# Patient Record
Sex: MALE | Race: AMERICAN INDIAN OR ALASKA NATIVE
[De-identification: names, ages, dates, MRNs, and addresses within clinical notes are randomized per-mention and may not be internally consistent; named-entity substitution may affect disease eponyms.]

---

## 2018-02-02 ENCOUNTER — HOSPITAL ENCOUNTER (EMERGENCY)
Dept: HOSPITAL 31 - C.ER | Age: 43
Discharge: HOME | End: 2018-02-02
Payer: MEDICAID

## 2018-02-02 VITALS — TEMPERATURE: 97.8 F | OXYGEN SATURATION: 97 % | HEART RATE: 93 BPM | RESPIRATION RATE: 20 BRPM

## 2018-02-02 VITALS — SYSTOLIC BLOOD PRESSURE: 160 MMHG | DIASTOLIC BLOOD PRESSURE: 89 MMHG

## 2018-02-02 DIAGNOSIS — M25.571: ICD-10-CM

## 2018-02-02 DIAGNOSIS — E11.9: ICD-10-CM

## 2018-02-02 DIAGNOSIS — M25.572: Primary | ICD-10-CM

## 2018-02-02 NOTE — C.PDOC
Chief Complaint (Nursing): Lower Extremity Problem/Injury





Past Medical History


Vital Signs: 





 Last Vital Signs











Temp  97.8 F   02/02/18 07:01


 


Pulse  93 H  02/02/18 07:01


 


Resp  20   02/02/18 07:01


 


BP  175/111 H  02/02/18 07:01


 


Pulse Ox  97   02/02/18 07:01














- Medical History


PMH: HTN





- Social History


Hx Alcohol Use: Yes


Hx Substance Use: No





- Immunization History


Hx Tetanus Toxoid Vaccination: No


Hx Influenza Vaccination: No


Hx Pneumococcal Vaccination: No





ED Course And Treatment


O2 Sat by Pulse Oximetry: 97





Disposition





- Disposition

## 2018-02-02 NOTE — RAD
PROCEDURE:  Bilateral Ankle Radiographs.



HISTORY:

atraumatic pain  



COMPARISON:

None



FINDINGS:



BONES:

Right Ankle: There is focal periosteal thickening seen at the right 

medial malleolus.  No evidence of acute fracture or dislocation.  

Calcaneal spur is noted. 



Left Ankle: Mild periosteal thickening is also noted at the medial 

malleolus of the left ankle.Calcaneal spur is also noted 



JOINTS:

Right Ankle:  Normal. No osteoarthritis. Ankle mortise maintained. 

Talar dome intact.



Left Ankle: Normal. No osteoarthritis. Ankle mortise maintained. 

Talar dome intact.



SOFT TISSUES:

Right Ankle: Mild soft tissue swelling 



Left Ankle: Mild soft tissue swelling 



OTHER FINDINGS:

None.



IMPRESSION:

No evidence of acute fracture or dislocation.



Mild periosteal thickening noted in both medial malleolus. Bilateral 

calcaneal spur.

## 2018-02-02 NOTE — C.PDOC
History Of Present Illness


42 year old male, with PMHx of HTN and diabetes, presents to ED for evaluation 

of bilateral ankle pain for the past week. Pt denies any trauma, injury, 

extremity weakness/numbness, or fever. Note, pt denies taking his HTN 

medication. 





Chief Complaint (Nursing): Lower Extremity Problem/Injury


History Per: Patient


History/Exam Limitations: no limitations


Onset/Duration Of Symptoms: Days


Current Symptoms Are (Timing): Still Present


Recent travel outside of the [place]: No


Additional History Per: Patient





Past Medical History


Reviewed: Historical Data, Nursing Documentation, Vital Signs


Vital Signs: 


 Last Vital Signs











Temp  97.8 F   02/02/18 07:01


 


Pulse  93 H  02/02/18 07:01


 


Resp  20   02/02/18 07:01


 


BP  160/89 H  02/02/18 08:51


 


Pulse Ox  97   02/02/18 11:02














- Medical History


PMH: Diabetes, HTN


Family History: States: No Known Family Hx





- Social History


Hx Alcohol Use: Yes


Hx Substance Use: No





- Immunization History


Hx Tetanus Toxoid Vaccination: No


Hx Influenza Vaccination: No


Hx Pneumococcal Vaccination: No





Review Of Systems


Except As Marked, All Systems Reviewed And Found Negative.


Constitutional: Negative for: Fever, Chills


Musculoskeletal: Positive for: Foot Pain (bilateral ankle).  Negative for: Leg 

Pain


Skin: Negative for: Rash, Bruising


Neurological: Negative for: Weakness, Numbness





Physical Exam





- Physical Exam


Appears: Non-toxic, No Acute Distress


Skin: Normal Color, Warm, Dry


Head: Atraumatic, Normacephalic


Extremity: Normal ROM (FROM of bilateral feet), Tenderness (bilateral ankle), 

No Pedal Edema, No Calf Tenderness, Capillary Refill (less than 2 seconds), No 

Deformity, No Swelling


Extremity: Bilateral: Atraumatic, Normal ROM


Pulses: Left Dorsalis Pedis: Normal, Right Dorsalis Pedis: Normal


Neurological/Psych: Oriented x3, Normal Speech, Normal Motor, Normal Sensation





ED Course And Treatment


O2 Sat by Pulse Oximetry: 97 (RA)


Pulse Ox Interpretation: Normal





- Other Rad


  ** B/L ankle x-ray


X-Ray: Interpreted by Me, Viewed By Me


Interpretation: No acute fracture or dislocation.


Progress Note: Bilateral ankle x-ray ordered and reviewed. Pt was given Motrin 

and Clonidine. Blood sugar was found to be low, pt was given food. On re-eval, 

pt is resting comfortably, no acute distress. Patient is being discharged home, 

with instructions to follow up with PMD in 1-2 days for further evaluation. 

Return to ED if symptoms persist or worsen.





Disposition





- Disposition


Referrals: 


Sarah Heredia MD [Staff Provider] - 


Disposition: HOME/ ROUTINE


Disposition Time: 10:33


Condition: STABLE


Additional Instructions: 


FOLLOW UP WITH PMD WITHIN 1-2 DAYS. RETURN TO ED IF FEEL WORSE. TAKE ALL YOUR 

MEDICATIONS AS PRESCRIBED BY YOUR PMD.


Prescriptions: 


Ibuprofen [Motrin Tab] 600 mg PO Q8 #30 tab


Famotidine [Pepcid] 20 mg PO BID #20 tab


Instructions:  Diabetic Foot Care (ED), Peripheral Neuropathy (ED)


Forms:  [facility] (English)





- Clinical Impression


Clinical Impression: 


 Pain in both feet








- PA / NP / Resident Statement


MD/DO has reviewed & agrees with the documentation as recorded.





- Scribe Statement


The provider has reviewed the documentation as recorded by the Yaniraibshekhar Paul





All medical record entries made by the Yaniraibshekhar were at my direction and 

personally dictated by me. I have reviewed the chart and agree that the record 

accurately reflects my personal performance of the history, physical exam, 

medical decision making, and the department course for this patient. I have 

also personally directed, reviewed, and agree with the discharge instructions 

and disposition.

## 2018-06-06 ENCOUNTER — HOSPITAL ENCOUNTER (EMERGENCY)
Dept: HOSPITAL 31 - C.ER | Age: 43
Discharge: HOME | End: 2018-06-06
Payer: MEDICAID

## 2018-06-06 VITALS
HEART RATE: 85 BPM | SYSTOLIC BLOOD PRESSURE: 152 MMHG | OXYGEN SATURATION: 97 % | TEMPERATURE: 98.5 F | RESPIRATION RATE: 18 BRPM | DIASTOLIC BLOOD PRESSURE: 94 MMHG

## 2018-06-06 VITALS — BODY MASS INDEX: 38.4 KG/M2

## 2018-06-06 DIAGNOSIS — E11.9: ICD-10-CM

## 2018-06-06 DIAGNOSIS — F11.10: Primary | ICD-10-CM

## 2018-06-06 DIAGNOSIS — I10: ICD-10-CM

## 2018-06-06 NOTE — C.PDOC
History Of Present Illness


44 y/o male presents to the ED for substance abuse. Admits that he snorts 20 

bags of heroin daily and drinks alcohol. Patient is complaining of some 

abdominal discomfort. No vomiting. He was able to eat and drink today. Patient 

reports problems with heroin making him see things, but does not want detox at 

this time. States he does not want any evaluation for his abdominal pain. 

Otherwise denies any suicidal or homicidal ideation. 





Time Seen by Provider: 06/06/18 18:12


Chief Complaint (Nursing): Substance Abuse


History Per: Patient


History/Exam Limitations: no limitations


Onset/Duration Of Symptoms: Hrs


Current Symptoms Are (Timing): Still Present


Modifying Factor(s): Alcohol, Other (Heroin)





Past Medical History


Reviewed: Historical Data, Nursing Documentation, Vital Signs


Vital Signs: 


 Last Vital Signs











Temp  98.5 F   06/06/18 17:35


 


Pulse  85   06/06/18 17:35


 


Resp  18   06/06/18 17:35


 


BP  152/94 H  06/06/18 17:35


 


Pulse Ox  97   06/06/18 18:26














- Medical History


PMH: Depression, Diabetes, HTN, Post Traumatic Stress Disorder


Family History: States: Unknown Family Hx





- Social History


Hx Alcohol Use: Yes


Hx Substance Use: Yes





- Immunization History


Hx Tetanus Toxoid Vaccination: No


Hx Influenza Vaccination: No


Hx Pneumococcal Vaccination: No





Review Of Systems


Constitutional: Negative for: Fever


Gastrointestinal: Positive for: Abdominal Pain.  Negative for: Nausea, Vomiting


Psych: Positive for: Other (substance abuse).  Negative for: Suicidal ideation, 

Withdrawal





Physical Exam





- Physical Exam


Appears: Non-toxic, No Acute Distress, Other (Intoxicated, but able to speak)


Skin: Normal Color, Warm, Dry


Head: Atraumatic, Normacephalic


Eye(s): bilateral: Normal Inspection, PERRL, EOMI


Oral Mucosa: Moist


Neck: Normal ROM, Supple


Chest: Symmetrical


Cardiovascular: Rhythm Regular, No Murmur


Respiratory: Normal Breath Sounds, No Rales, No Rhonchi, No Wheezing


Gastrointestinal/Abdominal: Soft, No Tenderness, No Guarding


Extremity: Bilateral: Atraumatic, Normal Color And Temperature, Normal ROM


Neurological/Psych: Normal Speech, Other (Awake, alert, responsive to verbal 

stimuli, ambulatory in the ED)


Gait: Steady





ED Course And Treatment


O2 Sat by Pulse Oximetry: 97 (RA)


Pulse Ox Interpretation: Normal





Medical Decision Making


Medical Decision Making: 


Initial Impression: 44 y/o intoxicated male, refusing detox and eval for abd 

pain





Initial Plan: 


--PO challenge





Patient given food and water in the ED, and is tolerating PO. On examination 

patient remains afebrile, alert, responsive, with clear speech and a steady 

gait. Patient is stable for discharge home. Counseled regarding diagnosis and 

provided with resources for substance abuse. 








Disposition





- Disposition


Disposition: HOME/ ROUTINE


Disposition Time: 18:30


Condition: STABLE


Instructions:  Opioid Use Disorder


Forms:  [employer] (English)





- Clinical Impression


Clinical Impression: 


 Opiate abuse, continuous








- Scribe Statement


The provider has reviewed the documentation as recorded by the Bogdan Mcconnell





Provider Attestation: 





All medical record entries made by the Bogdan were at my direction and 

personally dictated by me. I have reviewed the chart and agree that the record 

accurately reflects my personal performance of the history, physical exam, 

medical decision making, and the department course for this patient. I have 

also personally directed, reviewed, and agree with the discharge instructions 

and disposition.

## 2019-02-05 ENCOUNTER — HOSPITAL ENCOUNTER (INPATIENT)
Dept: HOSPITAL 31 - C.ER | Age: 44
LOS: 4 days | Discharge: HOME | DRG: 881 | End: 2019-02-09
Attending: PSYCHIATRY & NEUROLOGY | Admitting: PSYCHIATRY & NEUROLOGY
Payer: SELF-PAY

## 2019-02-05 VITALS — BODY MASS INDEX: 38.4 KG/M2

## 2019-02-05 DIAGNOSIS — F32.9: Primary | ICD-10-CM

## 2019-02-05 DIAGNOSIS — F60.9: ICD-10-CM

## 2019-02-05 DIAGNOSIS — F16.10: ICD-10-CM

## 2019-02-05 DIAGNOSIS — F43.10: ICD-10-CM

## 2019-02-05 DIAGNOSIS — F10.230: ICD-10-CM

## 2019-02-05 DIAGNOSIS — F14.10: ICD-10-CM

## 2019-02-05 DIAGNOSIS — F11.23: ICD-10-CM

## 2019-02-05 DIAGNOSIS — I10: ICD-10-CM

## 2019-02-05 DIAGNOSIS — E11.9: ICD-10-CM
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
## 2019-02-05 LAB
ALBUMIN SERPL-MCNC: 4 {NULL, G/DL} (ref 3.5–5)
ALBUMIN/GLOB SERPL: 1.6 {NULL, NULL} (ref 1–2.1)
ALT SERPL-CCNC: 16 {NULL, U/L} (ref 21–72)
AST SERPL-CCNC: 24 {NULL, U/L} (ref 17–59)
BASOPHILS # BLD AUTO: 0.1 {NULL, K/UL} (ref 0–0.2)
BASOPHILS NFR BLD: 1 {NULL, %} (ref 0–2)
BILIRUB UR-MCNC: NEGATIVE {NULL, NULL}
BUN SERPL-MCNC: 18 {NULL, MG/DL} (ref 9–20)
CALCIUM SERPL-MCNC: 8.5 {NULL, MG/DL} (ref 8.6–10.4)
EOSINOPHIL # BLD AUTO: 0.3 {NULL, K/UL} (ref 0–0.7)
EOSINOPHIL NFR BLD: 3.1 {NULL, %} (ref 0–4)
ERYTHROCYTE [DISTWIDTH] IN BLOOD BY AUTOMATED COUNT: 13.3 {NULL, %} (ref 11.5–14.5)
GFR NON-AFRICAN AMERICAN: > 60 {NULL, NULL}
GLUCOSE UR STRIP-MCNC: NORMAL {NULL, MG/DL}
HGB BLD-MCNC: 12.8 {NULL, G/DL} (ref 12–18)
LEUKOCYTE ESTERASE UR-ACNC: (no result) {NULL, LEU/UL}
LYMPHOCYTES # BLD AUTO: 3.3 {NULL, K/UL} (ref 1–4.3)
LYMPHOCYTES NFR BLD AUTO: 35.9 {NULL, %} (ref 20–40)
MCH RBC QN AUTO: 28.7 {NULL, PG} (ref 27–31)
MCHC RBC AUTO-ENTMCNC: 33.4 {NULL, G/DL} (ref 33–37)
MCV RBC AUTO: 85.9 {NULL, FL} (ref 80–94)
MONOCYTES # BLD: 0.4 {NULL, K/UL} (ref 0–0.8)
MONOCYTES NFR BLD: 3.9 {NULL, %} (ref 0–10)
NEUTROPHILS # BLD: 5.1 {NULL, K/UL} (ref 1.8–7)
NEUTROPHILS NFR BLD AUTO: 56.1 {NULL, %} (ref 50–75)
NRBC BLD AUTO-RTO: 0 {NULL, %} (ref 0–2)
PH UR STRIP: 5 {NULL, NULL} (ref 5–8)
PLATELET # BLD: 336 {NULL, K/UL} (ref 130–400)
PMV BLD AUTO: 6.8 {NULL, FL} (ref 7.2–11.7)
PROT UR STRIP-MCNC: (no result) {NULL, MG/DL}
RBC # BLD AUTO: 4.46 {NULL, MIL/UL} (ref 4.4–5.9)
RBC # UR STRIP: NEGATIVE {NULL, NULL}
SP GR UR STRIP: 1.02 {NULL, NULL} (ref 1–1.03)
UROBILINOGEN UR-MCNC: 2 {NULL, MG/DL} (ref 0.2–1)
WBC # BLD AUTO: 9.1 {NULL, K/UL} (ref 4.8–10.8)

## 2019-02-05 NOTE — C.PDOC
History Of Present Illness


The patient presents to the ED for psychiatric evaluation. Patient reports 

depression and questionable suicidal ideation. Patient also admits to alcohol, 

heroin, cocaine and PCP use today and is requesting detox. He denies homicidal 

ideation and offers no additional complaints at this time. 


Time Seen by Provider: 02/05/19 23:10


Chief Complaint (Nursing): Substance Abuse


History Per: Patient


History/Exam Limitations: no limitations


Onset/Duration Of Symptoms: Hrs


Current Symptoms Are (Timing): Still Present


Suicide/Self Injury Attempted (Context): None


Modifying Factor(s): Alcohol, Cocaine, Other (heroin, PCP )


Severity: None


Pain Scale Rating Of: 0


Associated Symptoms: Depression, Suicidal Thoughts (questionable )


Involuntary Hold By: None


Recent travel outside of the [place]: No


Additional History Per: Patient





Past Medical History


Reviewed: Historical Data, Nursing Documentation, Vital Signs


Vital Signs: 





                                Last Vital Signs











Temp  98.2 F   02/05/19 22:58


 


Pulse  97 H  02/05/19 22:58


 


Resp  17   02/05/19 22:58


 


BP  191/113 H  02/05/19 22:58


 


Pulse Ox  97   02/05/19 22:58














- Medical History


PMH: Depression, Diabetes, HTN, Post Traumatic Stress Disorder


Surgical History: No Surg Hx


Family History: States: Unknown Family Hx





- Social History


Hx Alcohol Use: Yes


Hx Substance Use: Yes





- Immunization History


Hx Tetanus Toxoid Vaccination: No


Hx Influenza Vaccination: No


Hx Pneumococcal Vaccination: No





Review Of Systems


Constitutional: Negative for: Fever, Chills


Cardiovascular: Negative for: Chest Pain, Palpitations


Respiratory: Negative for: Cough, Shortness of Breath


Gastrointestinal: Negative for: Nausea, Vomiting, Abdominal Pain, Diarrhea


Skin: Negative for: Rash, Lesions, Jaundice, Bruising


Psych: Positive for: Depression, Suicidal ideation (questionable ).  Negative 

for: Other (homicidal ideation )





Physical Exam





- Physical Exam


Appears: Non-toxic, No Acute Distress


Skin: Warm, Dry


Head: Normacephalic


Eye(s): bilateral: Normal Inspection


Oral Mucosa: Moist


Neck: Supple


Chest: Symmetrical, No Deformity


Respiratory: No Accessory Muscle Use


Extremity: Normal ROM


Neurological/Psych: Oriented x3





ED Course And Treatment





- Laboratory Results


Result Diagrams: 


                                 02/05/19 23:36





                                 02/05/19 23:36


O2 Sat by Pulse Oximetry: 97 (on RA)


Pulse Ox Interpretation: Normal


Progress Note: Bloodwork and urinalysis ordered.





Disposition


Discussed With DrCal: Lilia Chaidez


Comment: accepted the pt on her service and took over the care at 4:23 AM


Doctor Will See Patient In The: Hospital


Counseled Patient/Family Regarding: Studies Performed, Diagnosis





- Disposition


Disposition: HOSPITALIZED


Disposition Time: 23:10


Condition: FAIR


Forms:  [facility] (English)





- POA


Present On Arrival: None





- Clinical Impression


Clinical Impression: 


 Drug dependence, Alcohol abuse, Opiate abuse, continuous








- Scribe Statement


The provider has reviewed the documentation as recorded by the Scribe (Melissa Pantoja)


Provider Attestation: 








All medical record entries made by the Scribe were at my direction and 

personally dictated by me. I have reviewed the chart and agree that the record 

accurately reflects my personal performance of the history, physical exam, 

medical decision making, and the department course for this patient. I have also

personally directed, reviewed, and agree with the discharge instructions and 

disposition.





Decision To Admit





- Pt Status Changed To:


Hospital Disposition Of: Inpatient





- Admit Certification


Admit to Inpatient:: After my assessment, the patient will require 

hospitalization for at least two midnights.  This is because of the severity of 

symptoms shown, intensity of services needed, and/or the medical risk in this 

patient being treated as an outpatient.





- InPatient:


Physician Admission Certification: I certify that this patient requires 2 or 

more midnights of care for the following reason:: After my assessment, the 

patient will require hospitalization for at least two midnights.  This is 

because of the severity of symptoms shown, intensity of services needed, and/or 

the medical risk in this patient being treated as an outpatient





- .


Bed Request Type: Detox


Admitting Physician: Lilia Chaidez


Patient Diagnosis: 


 Drug dependence, Alcohol abuse, Opiate abuse, continuous

## 2019-02-06 PROCEDURE — GZHZZZZ GROUP PSYCHOTHERAPY: ICD-10-PCS | Performed by: PSYCHIATRY & NEUROLOGY

## 2019-02-06 PROCEDURE — GZ56ZZZ INDIVIDUAL PSYCHOTHERAPY, SUPPORTIVE: ICD-10-PCS | Performed by: PSYCHIATRY & NEUROLOGY

## 2019-02-06 NOTE — PCM.BM
<Liu Fernandez - Last Filed: 02/06/19 05:16>





Treatment Plan Problems





- Problems identified on initial assessmt


  ** Denial


Date Initiated: 02/06/19


Time Initiated: 06:00


Assessment reference: NA


Status: Active





  ** Defensive Coping


Date Initiated: 02/06/19


Time Initiated: 06:00


Assessment reference: NA


Status: Active





  ** Hopelessness


Date Initiated: 02/06/19


Assessment reference: NA


Status: Active





Treatment assets and liabiliti


Patient Assests: cooperative, ADL independent, negotiates basic needs, 

cognitively intact


Patient Liabilities: substance abuse





- Milieu Protocol


Maintain good personal hygiene: daily Encourage regular showers, daily Remind 

patient to perform daily oral care, daily Assist patient to perform ADL's


Maintain personal safety: every shift Educate patient to report safety concerns 

to staff, every shift Monitor environment for contraband/sharps


Medication safety: Monitor for expected outcome, potential side effects: every 

shift, Assess barriers to learning: every shift, Assess readiness for medication

education: every shift





<Lorna Guerrero - Last Filed: 02/07/19 13:09>





Treatment Plan Problems





- Problems identified on initial assessmt


  ** Denial


Date Initiated: 02/06/19


Time Initiated: 06:00


Assessment reference: NA


Status: Active





  ** Defensive Coping


Date Initiated: 02/06/19


Time Initiated: 06:00


Assessment reference: NA


Status: Active





  ** Hopelessness


Date Initiated: 02/06/19


Assessment reference: NA


Status: Active





Family Contact


Family involvement: No known Family/SO





- Goals for Treatment


Patient goals for treatment: Complete detox and apply for long-term adult rehab 

program.





Discharge/Continuing Care





- Education Needs


Education Needs: Patient Medication, Patient Diagnosis/Disease Process, Patient 

Coping Skills, Patient Anger Management skills, Patient Placement options, 

Patient Community resources





- Discharge


Discharge Criteria: Free of agitation, Normal sleep pattern, No longer exh

ibiting s/s of withdrawal, Reduction of target symptoms


Discharge to:: Substance Abuse Rehab





- Treatment Team Participation


Patient/Family/SO Statement: 





02/07/19 13:10


"I [facility] go to the rehab [facility]..."


Discussed with Family/SO: No


Was Patient/Family/SO present at Treatment Team Meeting: Yes





<Nancy Foreman - Last Filed: 02/09/19 21:07>





- Diagnosis


(1) Opiate abuse, continuous


Status: Acute   


Interventions: 





02/09/19 21:07


* Assess 7x/week regarding severity of withdrawal


* Educate regarding risks, benefits, side effects and alternatives of 

  medications


* Use Motivational Interviewing for abstinence


* Use CBT for relapse prevention


* Medication management for withdrawal symptoms


* Encourage medication assisted treatment


*

## 2019-02-06 NOTE — PCM.PSYCH
Initial Psychiatric Evaluation





- Initial Psychiatric Evaluation


Type of Admission: Voluntary


Legal Status: Capacity


Chief Complaint (in patient's own words): 





"Tired"


History of Present Illness and Precipitating Events: 





The patient is seen, chart reviewed and case discussed.


He is a very poor historian and looks overly sedated and possibly drugged.





This 43-year-old -American male, single, no child and unemployed


He claims he uses up to 100 backs IV and he has been using heroin 6-7 years. He 

was in detox and rehabilitation 3 times each and has history of OD.


He also uses 1 pint of alcohol, half tablets Xanax, cocaine and PCP.


He has history of blackouts and significant withdrawal symptoms.





He reports feeling depressed and yesterday he was suicidal. However today he 

says he is okay.


He also complains of anxiety and irritability.





Past psych history: Denies any treatment





Medical history: Denies





Family psych history: Denies


Current Medications: 





Active Medications











Generic Name Dose Route Start Last Admin





  Trade Name Freq  PRN Reason Stop Dose Admin


 


Clonidine HCl  0.1 mg  02/06/19 05:06  02/06/19 05:41





  Catapres  PO   0.1 mg





  Q6 PRN   Administration





  Symptoms of alcohol withdrawl   





     





     





     


 


Dicyclomine HCl  10 mg  02/06/19 05:05  





  Bentyl  PO   





  Q6 PRN   





  Muscle spasm   





     





     





     


 


Hydroxyzine HCl  25 mg  02/06/19 05:05  





  Atarax  PO   





  Q6 PRN   





  Anxiety   





     





     





     


 


Ibuprofen  600 mg  02/06/19 05:03  





  Motrin Tab  PO   





  Q6 PRN   





  Pain, moderate (4-7)   





     





     





     


 


Ondansetron HCl  4 mg  02/06/19 05:07  





  Zofran Tab  PO   





  Q6 PRN   





  Nausea/Vomiting   





     





     





     














Past Psychiatric History





- Past Psychiatric History


Pertinent Medical Hx (Current Medical&Sleep Prob, Allergies): 





                                    Allergies











Allergy/AdvReac Type Severity Reaction Status Date / Time


 


No Known Allergies Allergy   Verified 02/05/19 23:06








                                        





No Known Home Med  03/03/17 


Famotidine [Pepcid] 20 mg PO BID #20 tab 02/02/18 


Ibuprofen [Motrin Tab] 600 mg PO Q8 #30 tab 02/02/18 


chlordiazePOXIDE [Chlordiazepoxide HCl] 25 mg PO BID 02/02/18 


metFORMIN [glucOPHAGE] 500 mg PO BID 02/02/18 











Review of Systems





- Psychiatric


Psychiatric: Abnormal Sleep Pattern, Anhedonia, Anxiety, Change in Appetite, 

Depression, Difficulty Concentrating, Irritability.  absent: Hallucinations, 

Homicidal Ideation, Suicidal Ideation





Mental Status Examination





- Personal Presentation


Personal Presentation: Looks stated age





- Affect


Affect: Blunted





- Motor Activity


Motor Activity: Other (easily restless)





- Reliability in Providing Information


Reliability in Providing Information: Fair





- Speech


Speech: Other (slowed, slurred)





- Mood


Mood: Depressed, Anxious





- Formal Thought Process


Formal Thought Process: No Impairment





- Cognitive Functions


Orientation: Person, Place, Situation, Time


Sensorium: Drowsy


Attention/Concentration: Easily distracted


Abstract Thinking: [place]


Estimate of Intelligence: Below average


Judgement: Intact, as evidence by: Insight regarding need for hospitalization


Memory: Recent intact, as evidence by: Ability to recall events of the day, 

Remote impaired as evidenced by: Inability to recall sig life events





- Risk


Risk: Withdrawal, Diminished functioning





- Strength & Assets Inventory


Strength & Assets Inventory: Cooperative





- Limitations


Limitations: Living alone





DSM 5 DX





- DSM 5


DSM 5 Diagnosis: 





Opioid withdrawal


Opioid use disorder, severe


Cocaine use disorder, severe


PCP use disorder, severe


Alcohol withdrawal


Alcohol use disorder, severe


Personality disorder, unspecified


Depressive disorder, unspecified





- Recommended/Plan of Treatment


Treatment Recommendations and Plan of Treatment: 





Taper withmethadone when he is withdrawing more


Gabapentin for augmentation if needed


As needed medications


All risks, benefits and alternatives of the meds discussed,


 and the pt agreed and understood. 


Attend groups and activities


Supportive therapy and psychoeducation


MI for abstinence


CBT for relapse prevention


Encourage MAT


Refer to rehab or IOP, and self-help groups


Teach healthy lifestyle methods, i.e. diet, exercise, meditation


Smoking cessation with MI


Nicotine patch if needed





34 min


Projected ELOS: 4-5 days


Prognosis: good w treatment


Discharge Plan and Discharge Criteria: 





Refer to rehab





- Smoking Cessation


Smoking Cessation Initiated: Yes

## 2019-02-07 RX ADMIN — Medication SCH TAB: at 13:22

## 2019-02-07 NOTE — PCM.BM
Treatment Plan Problems





- Problems identified on initial assessmt


  ** Denial


Date Initiated: 02/06/19


Time Initiated: 06:00


Assessment reference: NA


Status: Active





  ** Defensive Coping


Date Initiated: 02/06/19


Time Initiated: 06:00


Assessment reference: NA


Status: Active





  ** Abnormal vital signs


Date Initiated: 02/06/19


Assessment reference: NA


Status: Active





Treatment assets and liabiliti


Patient Assests: cooperative, ADL independent, negotiates basic needs, 

cognitively intact


Patient Liabilities: substance abuse





- Milieu Protocol


Maintain good personal hygiene: daily Encourage regular showers, daily Remind 

patient to perform daily oral care, daily Assist patient to perform ADL's


Maintain personal safety: every shift Educate patient to report safety concerns 

to staff, every shift Monitor environment for contraband/sharps


Medication safety: Monitor for expected outcome, potential side effects: every 

shift, Assess barriers to learning: every shift, Assess readiness for medication

education: every shift

## 2019-02-07 NOTE — PCM.PYCHPN
Psychiatric Progress Note





- Psychiatric Progress Note


Patient seen today, length of contact: 16 minutes


Patient Chief Complaint: 





"I feel sick"


Problems Identified/Issues Discussed: 





The pt is seen, chart reviewed, case discussed with staff.


The pt is compliant with medications and reports no side-effects.


Symptoms are improving but needs more time to stabilize. 


He was again very irate this morning


Pt attends groups and activities.


Support given, psycho-education provided. 


After care discussed. He wants to go to [facility]


Medication Change: Yes (Detox changes daily)


Medical Record Reviewed: Yes





Mental Status Examination





- Cognitive Function


Orientation: Person, Place, Situation, Time


Memory: Intact


Attention: Poor


Concentration: Poor


Association: WNL


Fund of Knowledge: WNL





- Mood


Mood: Depressed, Anxious, Other (Angry)





- Affect


Affect: Other (Intense)





- Speech


Speech: Loud





- Formal Thought Process


Formal Thought Process: No Impairment





- Suicidal Ideation


Suicidal Ideation: No





- Homicidal Ideation


Homicidal Ideation: No





Goal/Treatment Plan





- Goal/Treatment Plan


Need for Continued Stay: Discharge may exacerbated symptoms, Severe functional 

impairment


Progress Toward Problem(s) and Goals/Treatment Plan: 





Taper with methadone


Gabapentin for augmentation if needed


As needed medications


All risks, benefits and alternatives of the meds discussed,


 and the pt agreed and understood. 


Attend groups and activities


Supportive therapy and psychoeducation


MI for abstinence


CBT for relapse prevention


Encourage MAT


Refer to rehab or IOP, and self-help groups


Teach healthy lifestyle methods, i.e. diet, exercise, meditation


Smoking cessation with MI


Nicotine patch if needed

## 2019-02-08 RX ADMIN — Medication SCH TAB: at 10:52

## 2019-02-08 NOTE — PCM.PYCHPN
Psychiatric Progress Note





- Psychiatric Progress Note


Patient seen today, length of contact: 22 min


Patient Chief Complaint: 





"I am not doing well"


Problems Identified/Issues Discussed: 


The pt is seen again, chart reviewed, and case is discussed with the team.


The pt denies any side-effects from meds.


Attends activities and groups, brief individual therapy provided


However, he is also VERY confrontational, irate easily, comes to people's faces 

and uses profanity. He also gets paranoid, blames people of lying to him. 

Finally he is obsessed with fod and anything about it irritaes him too much


Security was called 2-3 times already to talk with him. Team also spoke to him 

twice. He apologizes but does the same thing again. 


He may be d/c'ed administratively. 





Medication Change: Yes (Detox changes daily)


Medical Record Reviewed: Yes





Mental Status Examination





- Cognitive Function


Orientation: Person, Place, Situation, Time


Memory: Intact


Attention: Poor


Concentration: Poor


Association: WNL


Fund of Knowledge: WNL





- Mood


Mood: Depressed, Anxious, Other (Angry)





- Affect


Affect: Other (Intense)





- Speech


Speech: Loud





- Formal Thought Process


Formal Thought Process: No Impairment





- Suicidal Ideation


Suicidal Ideation: No





- Homicidal Ideation


Homicidal Ideation: No





Goal/Treatment Plan





- Goal/Treatment Plan


Need for Continued Stay: Discharge may exacerbated symptoms, Severe functional 

impairment


Progress Toward Problem(s) and Goals/Treatment Plan: 





Taper with methadone


Gabapentin for augmentation if needed


As needed medications


All risks, benefits and alternatives of the meds discussed,


 and the pt agreed and understood. 


Attend groups and activities


Supportive therapy and psychoeducation


MI for abstinence


CBT for relapse prevention


Encourage MAT


Refer to rehab or IOP, and self-help groups


Teach healthy lifestyle methods, i.e. diet, exercise, meditation


Smoking cessation with MI


Nicotine patch if needed





Estimated Date of D/C: 02/09/19

## 2019-02-09 VITALS
TEMPERATURE: 98 F | HEART RATE: 80 BPM | SYSTOLIC BLOOD PRESSURE: 106 MMHG | DIASTOLIC BLOOD PRESSURE: 63 MMHG | RESPIRATION RATE: 18 BRPM | OXYGEN SATURATION: 97 %

## 2019-02-09 RX ADMIN — Medication SCH: at 08:45
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
## 2019-03-14 ENCOUNTER — HOSPITAL ENCOUNTER (OUTPATIENT)
Dept: HOSPITAL 14 - H.ER | Age: 44
Setting detail: OBSERVATION
LOS: 2 days | Discharge: LEFT BEFORE DISCHARGE | End: 2019-03-16
Attending: PSYCHIATRY & NEUROLOGY | Admitting: HOSPITALIST
Payer: SELF-PAY

## 2019-03-14 VITALS — BODY MASS INDEX: 38.4 KG/M2

## 2019-03-14 DIAGNOSIS — Z79.899: ICD-10-CM

## 2019-03-14 DIAGNOSIS — F19.94: Primary | ICD-10-CM

## 2019-03-14 DIAGNOSIS — I10: ICD-10-CM

## 2019-03-14 DIAGNOSIS — E11.9: ICD-10-CM

## 2019-03-14 DIAGNOSIS — Z79.84: ICD-10-CM

## 2019-03-14 DIAGNOSIS — Z59.0: ICD-10-CM

## 2019-03-14 DIAGNOSIS — F10.129: ICD-10-CM

## 2019-03-14 DIAGNOSIS — F11.23: ICD-10-CM

## 2019-03-14 DIAGNOSIS — F14.10: ICD-10-CM

## 2019-03-14 DIAGNOSIS — F16.90: ICD-10-CM

## 2019-03-14 DIAGNOSIS — R94.31: ICD-10-CM

## 2019-03-14 DIAGNOSIS — R45.851: ICD-10-CM
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
## 2019-03-14 DIAGNOSIS — F43.10: ICD-10-CM

## 2019-03-14 DIAGNOSIS — F32.9: ICD-10-CM

## 2019-03-14 DIAGNOSIS — Z91.14: ICD-10-CM

## 2019-03-14 DIAGNOSIS — Z23: ICD-10-CM

## 2019-03-14 DIAGNOSIS — Z91.410: ICD-10-CM

## 2019-03-14 DIAGNOSIS — Z87.891: ICD-10-CM

## 2019-03-14 PROCEDURE — 80048 BASIC METABOLIC PNL TOTAL CA: CPT

## 2019-03-14 PROCEDURE — 85027 COMPLETE CBC AUTOMATED: CPT

## 2019-03-14 PROCEDURE — 71045 X-RAY EXAM CHEST 1 VIEW: CPT

## 2019-03-14 PROCEDURE — 93005 ELECTROCARDIOGRAM TRACING: CPT

## 2019-03-14 PROCEDURE — 90732 PPSV23 VACC 2 YRS+ SUBQ/IM: CPT

## 2019-03-14 PROCEDURE — 82948 REAGENT STRIP/BLOOD GLUCOSE: CPT

## 2019-03-14 PROCEDURE — 81003 URINALYSIS AUTO W/O SCOPE: CPT

## 2019-03-14 PROCEDURE — 85025 COMPLETE CBC W/AUTO DIFF WBC: CPT

## 2019-03-14 PROCEDURE — 80053 COMPREHEN METABOLIC PANEL: CPT

## 2019-03-14 PROCEDURE — 99285 EMERGENCY DEPT VISIT HI MDM: CPT

## 2019-03-14 PROCEDURE — 84484 ASSAY OF TROPONIN QUANT: CPT

## 2019-03-14 PROCEDURE — 87081 CULTURE SCREEN ONLY: CPT

## 2019-03-14 PROCEDURE — 90674 CCIIV4 VAC NO PRSV 0.5 ML IM: CPT

## 2019-03-14 SDOH — ECONOMIC STABILITY - HOUSING INSECURITY: HOMELESSNESS: Z59.0

## 2019-03-15 LAB
APAP SERPL-MCNC: < 10 UG/ML (ref 10–30)
BASOPHILS # BLD AUTO: 0.1 K/UL (ref 0–0.2)
BASOPHILS NFR BLD: 1.2 % (ref 0–2)
BILIRUB UR-MCNC: NEGATIVE MG/DL
BUN SERPL-MCNC: 21 MG/DL (ref 9–20)
CALCIUM SERPL-MCNC: 9.1 MG/DL (ref 8.4–10.2)
COLOR UR: YELLOW
EOSINOPHIL # BLD AUTO: 0.2 K/UL (ref 0–0.7)
EOSINOPHIL NFR BLD: 3.1 % (ref 0–4)
ERYTHROCYTE [DISTWIDTH] IN BLOOD BY AUTOMATED COUNT: 13.3 % (ref 11.5–14.5)
GFR NON-AFRICAN AMERICAN: > 60
GLUCOSE UR STRIP-MCNC: (no result) MG/DL
HGB BLD-MCNC: 13.1 G/DL (ref 12–18)
LEUKOCYTE ESTERASE UR-ACNC: (no result) LEU/UL
LYMPHOCYTES # BLD AUTO: 2.7 K/UL (ref 1–4.3)
LYMPHOCYTES NFR BLD AUTO: 40.3 % (ref 20–40)
MCH RBC QN AUTO: 28.6 PG (ref 27–31)
MCHC RBC AUTO-ENTMCNC: 33.9 G/DL (ref 33–37)
MCV RBC AUTO: 84.5 FL (ref 80–94)
MONOCYTES # BLD: 0.6 K/UL (ref 0–0.8)
MONOCYTES NFR BLD: 9 % (ref 0–10)
NEUTROPHILS # BLD: 3.2 K/UL (ref 1.8–7)
NEUTROPHILS NFR BLD AUTO: 46.4 % (ref 50–75)
NRBC BLD AUTO-RTO: 0.2 % (ref 0–0)
PH UR STRIP: 6 [PH] (ref 5–8)
PLATELET # BLD: 281 K/UL (ref 130–400)
PMV BLD AUTO: 7.2 FL (ref 7.2–11.7)
PROT UR STRIP-MCNC: NEGATIVE MG/DL
RBC # BLD AUTO: 4.59 MIL/UL (ref 4.4–5.9)
RBC # UR STRIP: NEGATIVE /UL
SALICYLATES SERPL-MCNC: < 1 MG/DL
SP GR UR STRIP: 1.02 (ref 1–1.03)
SPERM URINE: (no result) /HPF
URINE CLARITY: CLEAR
UROBILINOGEN UR-MCNC: (no result) MG/DL (ref 0.2–1)
WBC # BLD AUTO: 6.8 K/UL (ref 4.8–10.8)

## 2019-03-15 NOTE — CARD
--------------- APPROVED REPORT --------------





Date of service: 03/15/2019



EKG Measurement

Heart [id number]

IN [id number]

[id number]

[id number]

[id number]



<Conclusion>

Normal sinus rhythm

Possible Left atrial enlargement

Left axis deviation

T wave abnormality, consider anterolateral ischemia

Prolonged QT

Abnormal ECG
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
## 2019-03-15 NOTE — CP.PCM.HP
<Jose Griggs - Last Filed: 03/15/19 14:31>





History of Present Illness





- History of Present Illness


History of Present Illness: 





42 yo M with pmhx of htn, DM, depression, IVDA, and alcohol abuse presents to 

the ED with suicidal ideation. Suicidal thoughts for 1 week. Pt denies current 

suicidal plan, however, reports history of having a plan to get a gun and shoot 

himself.


While in the ED, EKG showed changes. Pt reported intermittent chest pain, 

however, denies them at present. 


Denies HI/A/VH





PMD: none


Surg: none


Soc: smokes 2 cigarettes per day for 4 years. Drinks beers (unknown amt) daily. 

Reports recent cocaine and heroin use. 


Famhx: none


NKDA 











Present on Admission





- Present on Admission


Any Indicators Present on Admission: Yes


History of Uncontrolled Diabetes: Yes





Review of Systems





- Review of Systems


Systems not reviewed;Unavailable: Uncooperative, Psychotic





- Cardiovascular


Cardiovascular: absent: Chest Pain





- Respiratory


Respiratory: absent: Cough





- Gastrointestinal


Gastrointestinal: absent: Abdominal Pain





Past Patient History





- Infectious Disease


Hx of Infectious Diseases: None





- Past Medical History & Family History


Past Medical History?: Yes





- Past Social History


Smoking Status: Light Smoker < 10 Cigarettes Daily


Alcohol: > 2 Drinks/Day


Drugs: Cocaine, Opiates





- CARDIAC


Hx Hypertension: Yes





- PULMONARY


Hx Tuberculosis: No





- NEUROLOGICAL


HX Cerebrovascular Accident: No


Hx Seizures: No





- ENDOCRINE/METABOLIC


Hx Endocrine Disorders: Yes


Hx Diabetes Mellitus Type 2: Yes





- HEMATOLOGICAL/ONCOLOGICAL


Hx Cancer: No


Hx Human Immunodeficiency Virus (HIV): No





- MUSCULOSKELETAL/RHEUMATOLOGICAL


Hx Falls: No (pt denied)





- GENITOURINARY/GYNECOLOGICAL


Hx Sexually Transmitted Disorders: No





- PSYCHIATRIC


Hx Depression: Yes


Hx Post Traumatic Stress Disorder: Yes





- SURGICAL HISTORY


Hx Surgeries: Yes


Hx Cardiac Catheterization: Yes





- ANESTHESIA


Hx Anesthesia: Yes


Hx Anesthesia Reactions: No





Meds


Allergies/Adverse Reactions: 


                                    Allergies











Allergy/AdvReac Type Severity Reaction Status Date / Time


 


No Known Allergies Allergy   Verified 03/15/19 00:37














Physical Exam





- Constitutional


Appears: Unkempt, Agitated





- Eye Exam


Eye Exam: EOMI





- ENT Exam


ENT Exam: Mucous Membranes Moist





- Respiratory Exam


Respiratory Exam: Clear to Auscultation Bilateral, NORMAL BREATHING PATTERN.  

absent: Wheezes





- Cardiovascular Exam


Cardiovascular Exam: REGULAR RHYTHM, +S1, +S2





- GI/Abdominal Exam


GI & Abdominal Exam: Normal Bowel Sounds, Soft.  absent: Tenderness





- Neurological Exam


Neurological exam: Alert





- Psychiatric Exam


Psychiatric exam: Agitated, Anxious, Suicidal Ideation





Results





- Vital Signs


Recent Vital Signs: 





                                Last Vital Signs











Temp  97.6 F   03/15/19 07:50


 


Pulse  75   03/15/19 07:50


 


Resp  19   03/15/19 07:50


 


BP  150/98 H  03/15/19 07:50


 


Pulse Ox  100   03/15/19 08:50














- Labs


Result Diagrams: 


                                 03/15/19 04:24





                                 03/15/19 04:24


Labs: 





                         Laboratory Results - last 24 hr











  03/15/19 03/15/19 03/15/19





  04:24 04:24 04:24


 


WBC    6.8


 


RBC    4.59


 


Hgb    13.1


 


Hct    38.8


 


MCV    84.5


 


MCH    28.6


 


MCHC    33.9


 


RDW    13.3


 


Plt Count    281


 


MPV    7.2


 


Neut % (Auto)    46.4 L


 


Lymph % (Auto)    40.3 H


 


Mono % (Auto)    9.0


 


Eos % (Auto)    3.1


 


Baso % (Auto)    1.2


 


Neut # (Auto)    3.2


 


Lymph # (Auto)    2.7


 


Mono # (Auto)    0.6


 


Eos # (Auto)    0.2


 


Baso # (Auto)    0.1


 


Sodium   137 


 


Potassium   4.2 


 


Chloride   105 


 


Carbon Dioxide   29 


 


Anion Gap   7 L 


 


BUN   21 H 


 


Creatinine   0.9 


 


Est GFR ( Amer)   > 60 


 


Est GFR (Non-Af Amer)   > 60 


 


Random Glucose   74 L 


 


Calcium   9.1 


 


Troponin I   


 


Urine Color   


 


Urine Clarity   


 


Urine pH   


 


Ur Specific Gravity   


 


Urine Protein   


 


Urine Glucose (UA)   


 


Urine Ketones   


 


Urine Blood   


 


Urine Nitrate   


 


Urine Bilirubin   


 


Urine Urobilinogen   


 


Ur Leukocyte Esterase   


 


Urine RBC (Auto)   


 


Urine Microscopic WBC   


 


Urine Sperm (Auto)   


 


Salicylates  < 1.0  


 


Urine Methadone Screen   


 


Acetaminophen  < 10.0 L  


 


Ur Amphetamines Screen   


 


U Cannabinoids Screen   


 


Alcohol, Quantitative   < 10 














  03/15/19 03/15/19 03/15/19





  08:30 08:30 08:30


 


WBC   


 


RBC   


 


Hgb   


 


Hct   


 


MCV   


 


MCH   


 


MCHC   


 


RDW   


 


Plt Count   


 


MPV   


 


Neut % (Auto)   


 


Lymph % (Auto)   


 


Mono % (Auto)   


 


Eos % (Auto)   


 


Baso % (Auto)   


 


Neut # (Auto)   


 


Lymph # (Auto)   


 


Mono # (Auto)   


 


Eos # (Auto)   


 


Baso # (Auto)   


 


Sodium   


 


Potassium   


 


Chloride   


 


Carbon Dioxide   


 


Anion Gap   


 


BUN   


 


Creatinine   


 


Est GFR ( Amer)   


 


Est GFR (Non-Af Amer)   


 


Random Glucose   


 


Calcium   


 


Troponin I    < 0.0120


 


Urine Color   Yellow 


 


Urine Clarity   Clear 


 


Urine pH   6.0 


 


Ur Specific Gravity   1.020 


 


Urine Protein   Negative 


 


Urine Glucose (UA)   Neg 


 


Urine Ketones   Negative 


 


Urine Blood   Negative 


 


Urine Nitrate   Negative 


 


Urine Bilirubin   Negative 


 


Urine Urobilinogen   0.2-1.0 


 


Ur Leukocyte Esterase   Neg 


 


Urine RBC (Auto)   3 


 


Urine Microscopic WBC   2 


 


Urine Sperm (Auto)   Rare H 


 


Salicylates   


 


Urine Methadone Screen  Negative  


 


Acetaminophen   


 


Ur Amphetamines Screen  Negative  


 


U Cannabinoids Screen  Negative  


 


Alcohol, Quantitative   














Assessment & Plan





- Assessment and Plan (Free Text)


Assessment: 





42 yo M with pmhx of htn, DM, depression, IVDA, and alcohol abuse presents to 

the ED with suicidal ideation, admitted for acute EKG changes and intermittent 

chest pain. 


Plan: 





Chest pain:


-EKG: reviewed


-Admit to tele


-Cardiac monitor


-Troponin: neg x1 f/u x2 q6


-monitor for chest pain


-f/u troponin, EKG





Suicidal Ideation


-Psychiatry: Dr. Mulligan: further recs appreciated


-1:1 ovservation





IVDA


-TOX: cocaine, PCP


-monitor for withdrawals





Diabetes


-accuchecks


-ICS


-hypoglycemia protocol





htn


-likely 2/2 pcp


-monitor bp





DVT prophylaxis


-SCD








Case and plan d/w Dr. Nikolas Griggs MD PGY2





<Luz Connor - Last Filed: 03/16/19 19:09>





Results





- Vital Signs


Recent Vital Signs: 





                                Last Vital Signs











Temp  98 F   03/16/19 00:00


 


Pulse  66   03/16/19 09:00


 


Resp  22   03/16/19 09:00


 


BP  140/71   03/16/19 09:00


 


Pulse Ox  100   03/16/19 04:00














- Labs


Result Diagrams: 


                                 03/16/19 04:30





                                 03/16/19 04:30


Labs: 





                         Laboratory Results - last 24 hr











  03/16/19 03/16/19





  04:30 04:30


 


WBC  6.8 


 


RBC  5.03 


 


Hgb  14.2 


 


Hct  43.1 


 


MCV  85.8 


 


MCH  28.3 


 


MCHC  33.0 


 


RDW  13.4 


 


Plt Count  298 


 


Sodium   140


 


Potassium   4.2


 


Chloride   107


 


Carbon Dioxide   26


 


Anion Gap   11


 


BUN   13


 


Creatinine   0.9


 


Est GFR ( Amer)   > 60


 


Est GFR (Non-Af Amer)   > 60


 


Random Glucose   108


 


Calcium   9.4


 


Total Bilirubin   0.7


 


AST   24


 


ALT   22


 


Alkaline Phosphatase   80


 


Total Protein   6.7


 


Albumin   3.6


 


Globulin   3.1


 


Albumin/Globulin Ratio   1.2














Attending/Attestation





- Attestation


I have personally seen and examined this patient.: Yes


I have fully participated in the care of the patient.: Yes


I have reviewed all pertinent clinical information: Yes


Notes (Text): 





03/16/19 19:08


Agree with findings and plan as above.

## 2019-03-15 NOTE — ED PDOC
- Laboratory Results


Result Diagrams: 


                                 03/15/19 04:24





                                 03/15/19 04:24





- ECG


O2 Sat by Pulse Oximetry: 100 (RA)


Pulse Ox Interpretation: Normal





Medical Decision Making


Medical Decision Makin


42yo male, brought to ER due to suicidal ideation. Patient signed out to me by 

Dr. Ho pending crisis evaluation and ER disposition.


Bedside rounds performed, patient resting in room comfortably.





722


Patient seen and evaluated by crisis team and per Dr. eHrrera, patient to be 

admitted due to depression.





804


EKG reviewed, patient noted to have gross ST abnormalities in anterolateral 

leads. no prior ekgs available for comparison in EMR./


Patient admits to using cocaine recently; additionally states he has had 

intermittent chest pain for the past week, no active chest pain currently.


At this time, patient is not medically cleared for psychiatric admission. 

Patient to be admitted medically, Troponin I ordered; patient given aspirin 3

25mg PO








0849


Discussed with hospitalist on call, and Dr. Quintero accepts patient for admission.





---

--------------------------------------------------------------------------------


--------------


Scribe Attestation:


Documented by Gunjan Rivera acting as a scribe for Gibran Ly DO.





Provider Scribe Attestation:


All medical record entries made by the Scribe were at my direction and 

personally dictated by me. I have reviewed the chart and agree that the record 

accurately reflects my personal performance of the history, physical exam, 

medical decision making, and the department course for this patient. I have also

personally directed, reviewed, and agree with the discharge instructions and di

sposition.








Disposition


Counseled Patient/Family Regarding: Studies Performed





- Clinical Impression


Clinical Impression: 


 Depression, Abnormal electrocardiogram [ECG] [EKG], Substance abuse








- POA


Present On Arrival: None





- Disposition


Disposition: Hospitalized as Observation Patient


Disposition Time: 09:00


Condition: FAIR

## 2019-03-15 NOTE — ED PDOC
HPI: Psych/Substance Abuse


Time Seen by Provider: 03/15/19 00:55


Chief Complaint (Nursing): Psychiatric Evaluation


Chief Complaint (Provider): Psychiatric Evaluation


History Per: Patient


History/Exam Limitations: no limitations


Onset/Duration Of Symptoms: Days (x 1)


Current Symptoms Are (Timing): Still Present


Modifying Factor(s): Alcohol


Associated Symptoms: Suicidal Thoughts


Additional Complaint(s): 


43 year old male with a history of IVDA and alcohol abuse presents to the ED via

EMS for evaluation of suicidal ideation. Patient reports feeling depressed with 

suicidal thoughts for one week, worsening today, prompting visit to the ED.  

Patient states he has a plan to get a gun and shoot himself. He appears to be 

intoxicated and admits to drinking alcohol tonight. Offers no medical 

complaints. 





PMD: none











Past Medical History


Reviewed: Historical Data


Vital Signs: 





                                Last Vital Signs











Temp  97.8 F   03/15/19 00:37


 


Pulse  79   03/15/19 00:37


 


Resp  18   03/15/19 00:37


 


BP  170/108 H  03/15/19 00:37


 


Pulse Ox  100   03/15/19 00:37














- Medical History


PMH: Depression, Diabetes, HTN, Post Traumatic Stress Disorder





- Surgical History


Surgical History: No Surg Hx





- Family History


Family History: States: Unknown Family Hx





- Immunization History


Hx Tetanus Toxoid Vaccination: No


Hx Influenza Vaccination: No


Hx Pneumococcal Vaccination: No





- Home Medications


Home Medications: 


                                Ambulatory Orders











 Medication  Instructions  Recorded


 


Famotidine [Pepcid] 20 mg PO BID #20 tab 18


 


Ibuprofen [Motrin Tab] 600 mg PO Q8 #30 tab 18


 


chlordiazePOXIDE [Chlordiazepoxide 25 mg PO BID 18





HCl]  


 


metFORMIN [glucOPHAGE] 500 mg PO BID 18


 


Gabapentin [Neurontin] 300 mg PO BID #60 cap 19


 


hydrOXYzine HCl [Atarax] 25 mg PO DAILY PRN #30 tab 19


 


metFORMIN [glucOPHAGE] 500 mg PO BID #60 tab 19


 


traZODone [Desyrel] 100 mg PO HS PRN #30 tab 19














- Allergies


Allergies/Adverse Reactions: 


                                    Allergies











Allergy/AdvReac Type Severity Reaction Status Date / Time


 


No Known Allergies Allergy   Verified 03/15/19 00:37














Review of Systems


ROS Statement: Except As Marked, All Systems Reviewed And Found Negative


Psych: Positive for: Suicidal ideation





Physical Exam





- Reviewed


Nursing Documentation Reviewed: Yes


Vital Signs Reviewed: Yes





- Physical Exam


Appears: Positive for: Non-toxic, No Acute Distress (appears intoxicated)


Head Exam: Positive for: ATRAUMATIC, NORMAL INSPECTION, NORMOCEPHALIC


Skin: Positive for: Normal Color, Warm, Dry


Eye Exam: Positive for: EOMI, Normal appearance, PERRL


Neck: Positive for: Normal, Painless ROM, Supple


Cardiovascular/Chest: Positive for: Regular Rate, Rhythm.  Negative for: Murmur


Respiratory: Positive for: Normal Breath Sounds.  Negative for: Wheezing, 

Respiratory Distress


Gastrointestinal/Abdominal: Positive for: Normal Exam, Soft.  Negative for: 

Tenderness, Mass, Guarding


Back: Positive for: Normal Inspection.  Negative for: L CVA Tenderness, R CVA 

Tenderness


Extremity: Positive for: Normal ROM.  Negative for: Deformity


Neurological/Psych: Positive for: Awake, Alert, Normal Tone, Mood/Affect (flat 

affect).  Negative for: Motor/Sensory Deficits





- Laboratory Results


Result Diagrams: 


                                 19 04:30





                                 19 04:30





- ECG


O2 Sat by Pulse Oximetry: 100 (RA)


Pulse Ox Interpretation: Normal





Medical Decision Making


Medical Decision Makin:42 


Impression: alcohol intoxication and suicidal ideation


Initial Plan: 


--Acetaminophen


--Salicylate 


--UDS 


--CBC 


--BMP


--Crisis evaluation


--1:1 observation








07:00 


Patient will be signed to Dr. Ly pending repeat CBC and reevaluation. 


---------------------------------------------------------------------------

----------------------


Scribe Attestation:


Documented by Ban Kern acting as a scribe for Yeyo Ho MD.





Provider Scribe Attestation:


All medical record entries made by the Scribe were at my direction and 

personally dictated by me. I have reviewed the chart and agree that the record 

accurately reflects my personal performance of the history, physical exam, 

medical decision making, and the department course for this patient. I have also

 personally directed, reviewed, and agree with the discharge instructions and 

disposition.











Disposition





- Clinical Impression


Clinical Impression: 


 Depression, Abnormal electrocardiogram [ECG] [EKG], Substance abuse








- Patient ED Disposition


Is Patient to be Admitted: Transfer of Care





- Disposition


Disposition: Transfer of Care


Disposition Time: 07:00


Condition: FAIR

## 2019-03-15 NOTE — RAD
Date of service: 



03/15/2019



HISTORY:

 r/o infiltrate 



COMPARISON:

Chest radiographs 02/20/2016. 



FINDINGS:



LUNGS:

No active pulmonary disease.



PLEURA:

No significant pleural effusion identified, no pneumothorax apparent.



CARDIOVASCULAR:

No aortic atherosclerotic calcification present.



Normal cardiac size. No pulmonary vascular congestion. 



OSSEOUS STRUCTURES:

No significant abnormalities.



VISUALIZED UPPER ABDOMEN:

Normal.



OTHER FINDINGS:

None.



IMPRESSION:

No interval acute cardiopulmonary disease appreciated.

## 2019-03-16 VITALS — DIASTOLIC BLOOD PRESSURE: 71 MMHG | SYSTOLIC BLOOD PRESSURE: 140 MMHG

## 2019-03-16 VITALS — RESPIRATION RATE: 22 BRPM | HEART RATE: 66 BPM

## 2019-03-16 VITALS — TEMPERATURE: 98 F

## 2019-03-16 VITALS — OXYGEN SATURATION: 100 %

## 2019-03-16 LAB
ALBUMIN SERPL-MCNC: 3.6 G/DL (ref 3.5–5)
ALBUMIN/GLOB SERPL: 1.2 {RATIO} (ref 1–2.1)
ALT SERPL-CCNC: 22 U/L (ref 21–72)
AST SERPL-CCNC: 24 U/L (ref 17–59)
BUN SERPL-MCNC: 13 MG/DL (ref 9–20)
CALCIUM SERPL-MCNC: 9.4 MG/DL (ref 8.4–10.2)
ERYTHROCYTE [DISTWIDTH] IN BLOOD BY AUTOMATED COUNT: 13.4 % (ref 11.5–14.5)
GFR NON-AFRICAN AMERICAN: > 60
HGB BLD-MCNC: 14.2 G/DL (ref 12–18)
MCH RBC QN AUTO: 28.3 PG (ref 27–31)
MCHC RBC AUTO-ENTMCNC: 33 G/DL (ref 33–37)
MCV RBC AUTO: 85.8 FL (ref 80–94)
PLATELET # BLD: 298 K/UL (ref 130–400)
RBC # BLD AUTO: 5.03 MIL/UL (ref 4.4–5.9)
WBC # BLD AUTO: 6.8 K/UL (ref 4.8–10.8)
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
## 2019-03-16 NOTE — CP.PCM.CON
History of Present Illness





- History of Present Illness


History of Present Illness: 





This is a 43 yr old male with h/o opiate abuse,cocaine and PCP abuse  and 

recentlly d/c from [facility] detox.and admitted for withdrawl fron opiate 

and abnormal EKG to ICU and pt was seen by dr bruno in the ER and pt expressed 

suicudal thoughts wanting to shoot himself and placed on 1:1 observation. .Psych

consult requested for reevaluation  as pt  wants to sign out today and denies 

any psychiatruc issues. pt states that he was under the influence of multiple 

drugs including cocaine and opiates and does not recall making any suicidal 

statement and has no attention of hurting self or otherss.pt understands that he

need to go for opiate rehab and [facility] he is d/c .pt

understands his medical issues and need for continued treatment but does not 

want to stay in hospital and wants to go to opiate rehab.pt is able to contract 

for safety and willing to seek outpt medical treatment and wants to stop abusing

illicit drugs .





Past Patient History





- Infectious Disease


Hx of Infectious Diseases: None





- Past Medical History & Family History


Past Medical History?: Yes





- Past Social History


Smoking Status: Light Smoker < 10 Cigarettes Daily


Alcohol: > 2 Drinks/Day


Drugs: Cocaine, Opiates





- CARDIAC


Hx Hypertension: Yes





- PULMONARY


Hx Tuberculosis: No





- NEUROLOGICAL


HX Cerebrovascular Accident: No


Hx Seizures: No





- ENDOCRINE/METABOLIC


Hx Endocrine Disorders: Yes


Hx Diabetes Mellitus Type 2: Yes





- HEMATOLOGICAL/ONCOLOGICAL


Hx Cancer: No


Hx Human Immunodeficiency Virus (HIV): No





- MUSCULOSKELETAL/RHEUMATOLOGICAL


Hx Falls: No (pt denied)





- GENITOURINARY/GYNECOLOGICAL


Hx Sexually Transmitted Disorders: No





- PSYCHIATRIC


Hx Depression: Yes


Hx Post Traumatic Stress Disorder: Yes





- SURGICAL HISTORY


Hx Surgeries: Yes


Hx Cardiac Catheterization: Yes





- ANESTHESIA


Hx Anesthesia: Yes


Hx Anesthesia Reactions: No





Meds


Allergies/Adverse Reactions: 


                                    Allergies











Allergy/AdvReac Type Severity Reaction Status Date / Time


 


No Known Allergies Allergy   Verified 03/15/19 00:37














- Medications


Medications: 


                               Current Medications





Al Hydrox/Mg Hydrox/Simethicone (Maalox Plus 30 Ml)  30 ml PO QID PRN


   PRN Reason: Abdominal Discomfort


Clonidine HCl (Catapres)  0.1 mg PO Q8 ANG


   Stop: 03/18/19 17:01


   Last Admin: 03/16/19 08:38 Dose:  0.1 mg


Dextrose (Dextrose 50% Inj)  0 ml IV STAT PRN; Protocol


   PRN Reason: Hypoglycemia Protocol


Dextrose (Glutose 15)  0 gm PO ONCE PRN; Protocol


   PRN Reason: Hypoglycemia Protocol


Escitalopram Oxalate (Lexapro)  5 mg PO DAILY [facility]


Famotidine (Pepcid)  20 mg PO BID [facility]


   Last Admin: 03/16/19 08:41 Dose:  20 mg


Gabapentin (Neurontin)  300 mg PO BID [facility]


   Last Admin: 03/15/19 16:08 Dose:  300 mg


Glucagon (Glucagen Diagnostic Kit)  0 mg IM STAT PRN; Protocol


   PRN Reason: Hypoglycemia Protocol


Ibuprofen (Motrin Tab)  600 mg PO Q6 PRN


   PRN Reason: Pain, moderate (4-7)


Loperamide HCl (Imodium)  2 mg PO Q4 PRN


   PRN Reason: After Loose Bowel Movement


Lorazepam (Ativan)  0.5 mg IM Q4H PRN


   PRN Reason: Agitation


Ondansetron HCl (Zofran Tab)  4 mg PO Q6 PRN


   PRN Reason: Nausea/Vomiting


Trazodone HCl (Desyrel)  100 mg PO HS PRN


   PRN Reason: Insomnia











Physical Exam





- Psychiatric Exam


Psychiatric exam: Normal Affect, Normal Mood


Additional comments: 


Pt is alert,oriented x3 with intact cogntion.no psychosis.pt adamantly denies 

suicidal and homicidal ideation.stable mood and fajr insight and wants to go for

opiate rehab.





Results





- Vital Signs


Recent Vital Signs: 


                                Last Vital Signs











Temp  98 F   03/16/19 00:00


 


Pulse  66   03/16/19 09:00


 


Resp  22   03/16/19 09:00


 


BP  140/71   03/16/19 09:00


 


Pulse Ox  100   03/16/19 04:00














- Labs


Result Diagrams: 


                                 03/16/19 04:30





                                 03/16/19 04:30


Labs: 


                         Laboratory Results - last 24 hr











  03/15/19 03/16/19 03/16/19





  17:30 04:30 04:30


 


WBC   6.8 


 


RBC   5.03 


 


Hgb   14.2 


 


Hct   43.1 


 


MCV   85.8 


 


MCH   28.3 


 


MCHC   33.0 


 


RDW   13.4 


 


Plt Count   298 


 


Sodium    140


 


Potassium    4.2


 


Chloride    107


 


Carbon Dioxide    26


 


Anion Gap    11


 


BUN    13


 


Creatinine    0.9


 


Est GFR ( Amer)    > 60


 


Est GFR (Non-Af Amer)    > 60


 


Random Glucose    108


 


Calcium    9.4


 


Total Bilirubin    0.7


 


AST    24


 


ALT    22


 


Alkaline Phosphatase    80


 


Troponin I  < 0.0120  


 


Total Protein    6.7


 


Albumin    3.6


 


Globulin    3.1


 


Albumin/Globulin Ratio    1.2














Assessment & Plan





- Assessment and Plan (Free Text)


Assessment: 





Polysubstance abuse and dependence( opiate,cocaine and PCP) 


Plan: 





Pt is offered inpt treatment for medical issues as well as detox but pt does not

want to stay and willing to go for opiate rehab at [facility].pt infor

med about risks of signing out AMA and also benefits of staying in hospital but 

he still wants to sign out AMA and is competent to sign out against medical 

advice .pt advised to come back to ER if he has suicidal thoughts or worsening 

of his medical issues.pt d/c against medical advice.

## 2019-03-16 NOTE — CP.PCM.CON
History of Present Illness





- History of Present Illness


History of Present Illness: 





                             I have attempted to see the patient at Dr. Alexander's 

request.


Patient declined to be examined by me and asked me to leave the room.


I have discussed the situation with Dr. Alexander.





Past Patient History





- Infectious Disease


Hx of Infectious Diseases: None





- Past Medical History & Family History


Past Medical History?: Yes





- Past Social History


Smoking Status: Light Smoker < 10 Cigarettes Daily


Alcohol: > 2 Drinks/Day


Drugs: Cocaine, Opiates





- CARDIAC


Hx Hypertension: Yes





- PULMONARY


Hx Tuberculosis: No





- NEUROLOGICAL


HX Cerebrovascular Accident: No


Hx Seizures: No





- ENDOCRINE/METABOLIC


Hx Endocrine Disorders: Yes


Hx Diabetes Mellitus Type 2: Yes





- HEMATOLOGICAL/ONCOLOGICAL


Hx Cancer: No


Hx Human Immunodeficiency Virus (HIV): No





- MUSCULOSKELETAL/RHEUMATOLOGICAL


Hx Falls: No (pt denied)





- GENITOURINARY/GYNECOLOGICAL


Hx Sexually Transmitted Disorders: No





- PSYCHIATRIC


Hx Depression: Yes


Hx Post Traumatic Stress Disorder: Yes





- SURGICAL HISTORY


Hx Surgeries: Yes


Hx Cardiac Catheterization: Yes





- ANESTHESIA


Hx Anesthesia: Yes


Hx Anesthesia Reactions: No





Meds


Allergies/Adverse Reactions: 


                                    Allergies











Allergy/AdvReac Type Severity Reaction Status Date / Time


 


No Known Allergies Allergy   Verified 03/15/19 00:37














- Medications


Medications: 


                               Current Medications





Al Hydrox/Mg Hydrox/Simethicone (Maalox Plus 30 Ml)  30 ml PO QID PRN


   PRN Reason: Abdominal Discomfort


Clonidine HCl (Catapres)  0.1 mg PO Q8 [facility]


   Stop: 03/18/19 17:01


   Last Admin: 03/16/19 08:38 Dose:  0.1 mg


Dextrose (Dextrose 50% Inj)  0 ml IV STAT PRN; Protocol


   PRN Reason: Hypoglycemia Protocol


Dextrose (Glutose 15)  0 gm PO ONCE PRN; Protocol


   PRN Reason: Hypoglycemia Protocol


Escitalopram Oxalate (Lexapro)  5 mg PO DAILY [facility]


Famotidine (Pepcid)  20 mg PO BID [facility]


   Last Admin: 03/16/19 08:41 Dose:  20 mg


Gabapentin (Neurontin)  300 mg PO BID [facility]


   Last Admin: 03/15/19 16:08 Dose:  300 mg


Glucagon (Glucagen Diagnostic Kit)  0 mg IM STAT PRN; Protocol


   PRN Reason: Hypoglycemia Protocol


Ibuprofen (Motrin Tab)  600 mg PO Q6 PRN


   PRN Reason: Pain, moderate (4-7)


Loperamide HCl (Imodium)  2 mg PO Q4 PRN


   PRN Reason: After Loose Bowel Movement


Lorazepam (Ativan)  0.5 mg IM Q4H PRN


   PRN Reason: Agitation


Ondansetron HCl (Zofran Tab)  4 mg PO Q6 PRN


   PRN Reason: Nausea/Vomiting


Trazodone HCl (Desyrel)  100 mg PO HS PRN


   PRN Reason: Insomnia











Results





- Vital Signs


Recent Vital Signs: 


                                Last Vital Signs











Temp  98 F   03/16/19 00:00


 


Pulse  66   03/16/19 09:00


 


Resp  22   03/16/19 09:00


 


BP  140/71   03/16/19 09:00


 


Pulse Ox  100   03/16/19 04:00














- Labs


Result Diagrams: 


                                 03/16/19 04:30





                                 03/16/19 04:30


Labs: 


                         Laboratory Results - last 24 hr











  03/15/19 03/16/19 03/16/19





  17:30 04:30 04:30


 


WBC   6.8 


 


RBC   5.03 


 


Hgb   14.2 


 


Hct   43.1 


 


MCV   85.8 


 


MCH   28.3 


 


MCHC   33.0 


 


RDW   13.4 


 


Plt Count   298 


 


Sodium    140


 


Potassium    4.2


 


Chloride    107


 


Carbon Dioxide    26


 


Anion Gap    11


 


BUN    13


 


Creatinine    0.9


 


Est GFR ( Amer)    > 60


 


Est GFR (Non-Af Amer)    > 60


 


Random Glucose    108


 


Calcium    9.4


 


Total Bilirubin    0.7


 


AST    24


 


ALT    22


 


Alkaline Phosphatase    80


 


Troponin I  < 0.0120  


 


Total Protein    6.7


 


Albumin    3.6


 


Globulin    3.1


 


Albumin/Globulin Ratio    1.2

## 2019-03-18 NOTE — CARD
--------------- APPROVED REPORT --------------





Date of service: 03/16/2019



EKG Measurement

Heart [id number]

NM [id number]

[id number]

[id number]

[id number]



<Conclusion>

Normal sinus rhythm

Possible Left atrial enlargement

Incomplete right bundle branch block

Left anterior fascicular block

T wave abnormality, consider inferolateral ischemia

Abnormal ECG

## 2019-04-10 ENCOUNTER — HOSPITAL ENCOUNTER (EMERGENCY)
Dept: HOSPITAL 31 - C.ER | Age: 44
LOS: 1 days | Discharge: HOME | End: 2019-04-11
Payer: SELF-PAY

## 2019-04-10 VITALS — BODY MASS INDEX: 38.4 KG/M2

## 2019-04-10 DIAGNOSIS — F19.10: Primary | ICD-10-CM

## 2019-04-10 DIAGNOSIS — I10: ICD-10-CM

## 2019-04-10 DIAGNOSIS — R07.9: ICD-10-CM

## 2019-04-10 LAB
ALBUMIN SERPL-MCNC: 4.3 G/DL (ref 3.5–5)
ALBUMIN/GLOB SERPL: 1.4 {RATIO} (ref 1–2.1)
ALT SERPL-CCNC: 16 U/L (ref 21–72)
AST SERPL-CCNC: 26 U/L (ref 17–59)
BASOPHILS # BLD AUTO: 0.1 K/UL (ref 0–0.2)
BASOPHILS NFR BLD: 1.3 % (ref 0–2)
BILIRUB UR-MCNC: NEGATIVE MG/DL
BUN SERPL-MCNC: 14 MG/DL (ref 9–20)
CALCIUM SERPL-MCNC: 9.1 MG/DL (ref 8.6–10.4)
EOSINOPHIL # BLD AUTO: 0.3 K/UL (ref 0–0.7)
EOSINOPHIL NFR BLD: 3.3 % (ref 0–4)
ERYTHROCYTE [DISTWIDTH] IN BLOOD BY AUTOMATED COUNT: 13.3 % (ref 11.5–14.5)
GFR NON-AFRICAN AMERICAN: > 60
GLUCOSE UR STRIP-MCNC: NORMAL MG/DL
HGB BLD-MCNC: 14.3 G/DL (ref 12–18)
LEUKOCYTE ESTERASE UR-ACNC: (no result) LEU/UL
LYMPHOCYTES # BLD AUTO: 2.7 K/UL (ref 1–4.3)
LYMPHOCYTES NFR BLD AUTO: 34 % (ref 20–40)
MCH RBC QN AUTO: 28.5 PG (ref 27–31)
MCHC RBC AUTO-ENTMCNC: 33.2 G/DL (ref 33–37)
MCV RBC AUTO: 86 FL (ref 80–94)
MONOCYTES # BLD: 0.4 K/UL (ref 0–0.8)
MONOCYTES NFR BLD: 4.9 % (ref 0–10)
NEUTROPHILS # BLD: 4.4 K/UL (ref 1.8–7)
NEUTROPHILS NFR BLD AUTO: 56.5 % (ref 50–75)
NRBC BLD AUTO-RTO: 0.1 % (ref 0–2)
PH UR STRIP: 5 [PH] (ref 5–8)
PLATELET # BLD: 314 K/UL (ref 130–400)
PMV BLD AUTO: 7.2 FL (ref 7.2–11.7)
PROT UR STRIP-MCNC: (no result) MG/DL
RBC # BLD AUTO: 5 MIL/UL (ref 4.4–5.9)
RBC # UR STRIP: NEGATIVE /UL
SP GR UR STRIP: 1.03 (ref 1–1.03)
SQUAMOUS EPITHIAL: < 1 /HPF (ref 0–5)
UROBILINOGEN UR-MCNC: 2 MG/DL (ref 0.2–1)
WBC # BLD AUTO: 7.8 K/UL (ref 4.8–10.8)

## 2019-04-10 PROCEDURE — 99285 EMERGENCY DEPT VISIT HI MDM: CPT

## 2019-04-10 PROCEDURE — 81001 URINALYSIS AUTO W/SCOPE: CPT

## 2019-04-10 PROCEDURE — 84484 ASSAY OF TROPONIN QUANT: CPT

## 2019-04-10 PROCEDURE — 85025 COMPLETE CBC W/AUTO DIFF WBC: CPT

## 2019-04-10 PROCEDURE — 93005 ELECTROCARDIOGRAM TRACING: CPT

## 2019-04-10 PROCEDURE — 71046 X-RAY EXAM CHEST 2 VIEWS: CPT

## 2019-04-10 PROCEDURE — 96361 HYDRATE IV INFUSION ADD-ON: CPT

## 2019-04-10 PROCEDURE — 71045 X-RAY EXAM CHEST 1 VIEW: CPT

## 2019-04-10 PROCEDURE — 80053 COMPREHEN METABOLIC PANEL: CPT

## 2019-04-10 PROCEDURE — 96374 THER/PROPH/DIAG INJ IV PUSH: CPT

## 2019-04-10 PROCEDURE — 82948 REAGENT STRIP/BLOOD GLUCOSE: CPT

## 2019-04-10 NOTE — C.PDOC
History Of Present Illness


43 year old male presents to the ED requesting detox for heroin and cocaine 

abuse. Patient reports his last use was today, he snorts both substances. 

Patient also c/o chest pain, SOB that started yesterday. Patient states he broke

out in a sweat with some nausea but no vomiting. Upon arrival patient's blood 

pressure was elevated 197/132, but patient denies headache, weakness, numbness, 

slurred speech. Patient denies SI/HI, hallucinations. 


Time Seen by Provider: 04/10/19 19:51


Chief Complaint (Nursing): Substance Abuse


History Per: Patient


History/Exam Limitations: intoxication


Onset/Duration Of Symptoms: Days


Current Symptoms Are (Timing): Still Present


Modifying Factor(s): Narcotics, Cocaine


Associated Symptoms: denies: Depression, Suicidal Thoughts, Suicidal Plan


Recent travel outside of the [place]: No


Additional History Per: Patient





Past Medical History


Reviewed: Historical Data, Nursing Documentation, Vital Signs


Vital Signs: 





                                Last Vital Signs











Temp  98.1 F   04/10/19 19:32


 


Pulse  81   04/10/19 19:32


 


Resp  16   04/10/19 19:32


 


BP  197/132 H  04/10/19 19:32


 


Pulse Ox  96   04/10/19 19:32














- Medical History


PMH: Depression, Diabetes, HTN, Post Traumatic Stress Disorder


   Denies: Hepatitis, HIV, Seizures, Sexually Transmitted Disease


Surgical History: No Surg Hx





- [facility] Procedures











GROUP PSYCHOTHERAPY (02/06/19)


INDIVIDUAL PSYCHOTHERAPY, SUPPORTIVE (02/06/19)








Family History: States: Unknown Family Hx





- Social History


Hx Alcohol Use: Yes


Hx Substance Use: Yes





- Immunization History


Hx Tetanus Toxoid Vaccination: No


Hx Influenza Vaccination: No


Hx Pneumococcal Vaccination: No





Review Of Systems


Constitutional: Positive for: Sweats.  Negative for: Fever, Chills


Eyes: Negative for: Vision Change


Cardiovascular: Positive for: Chest Pain.  Negative for: Palpitations


Respiratory: Positive for: Shortness of Breath.  Negative for: Cough


Gastrointestinal: Positive for: Nausea.  Negative for: Vomiting, Abdominal Pain


Skin: Negative for: Rash


Neurological: Negative for: Weakness, Numbness, Headache, Dizziness


Psych: Negative for: Depression, Suicidal ideation





Physical Exam





- Physical Exam


Appears: Non-toxic, No Acute Distress


Skin: Normal Color, Warm, Dry


Head: Atraumatic, Normacephalic


Eye(s): bilateral: Normal Inspection


Oral Mucosa: Moist


Neck: Normal ROM, Supple


Chest: Symmetrical


Cardiovascular: Rhythm Regular


Respiratory: Normal Breath Sounds, No Rales, No Rhonchi, No Wheezing


Gastrointestinal/Abdominal: Soft, No Tenderness, No Guarding, No Rebound


Extremity: Normal ROM, No Tenderness, No Swelling


Neurological/Psych: Oriented x3, Normal Speech, Normal Cognition


Gait: Steady





ED Course And Treatment





- Laboratory Results


Result Diagrams: 


                                 04/10/19 20:11





                                 04/10/19 20:11


Lab Interpretation: No Acute Changes (UDS + opiates, PCP, cocaine, marijuana)


ECG: Interpreted By Me


ECG Rhythm: Sinus Rhythm, ST/T Changes (with T wave inversions II, III, AVF, V4-

6)


ECG Interpretation: Abnormal


O2 Sat by Pulse Oximetry: 96 (ON RA)


Pulse Ox Interpretation: Normal





- Radiology


CXR: Interpreted by Me


CXR Interpretation: Yes: No Acute Disease


Reevaluation Time: 22:56


Reassessment Condition: Improved (BP and heart rate decreased. Patient is more 

comfortable. Advised of unavailability of detox beds at this time. Seen by 

crisis worker and given info about our waitlist and additional possible detox 

resourcees.)





Medical Decision Making


Medical Decision Making: 


Plan:


* EKG


* CXR


* Labs


* UA


* Trandate 20 mg IVP





Disposition


Counseled Patient/Family Regarding: Studies Performed, Diagnosis, Need For 

Followup





- Disposition


Referrals: 


Alcoholics Anonymous [Outside]


[facility] [Outside]


Disposition: HOME/ ROUTINE


Disposition Time: 22:58


Condition: IMPROVED


Instructions:  Polysubstance Abuse


Forms:  [facility] (English)





- Clinical Impression


Clinical Impression: 


 Substance abuse, Chest pain








- Scribe Statement


The provider has reviewed the documentation as recorded by the Scribe


Levi Sandra





All medical record entries made by the Scribe were at my direction and 

personally dictated by me. I have reviewed the chart and agree that the record 

accurately reflects my personal performance of the history, physical exam, 

medical decision making, and the department course for this patient. I have also

 personally directed, reviewed, and agree with the discharge instructions and 

disposition.

## 2019-04-11 VITALS
OXYGEN SATURATION: 99 % | HEART RATE: 62 BPM | TEMPERATURE: 98 F | DIASTOLIC BLOOD PRESSURE: 94 MMHG | SYSTOLIC BLOOD PRESSURE: 185 MMHG | RESPIRATION RATE: 16 BRPM

## 2019-04-11 NOTE — RAD
Date of service: 



04/10/2019



HISTORY:

Question of left upper lobe nodule 



COMPARISON:

Plain radiograph performed earlier the same day.



TECHNIQUE:

Chest PA and lateral



FINDINGS:



LINES AND TUBES:

None. 



LUNG AND PLEURA:

The lungs are well inflated.  There is an apparent faint 6 mm nodule 

in the right upper lobe.  There is an asymmetric opacity overlying 

the anterior left 1st rib.  No pleural effusion or pneumothorax.



HEART AND MEDIASTINUM:

The heart is not enlarged.  No aortic atherosclerotic calcifications 

present.  The hilar and mediastinal contours are within normal limits.



SKELETAL STRUCTURES:

The bony structures are within normal limits for the patient's age.



VISUALIZED UPPER ABDOMEN:

Normal.



OTHER FINDINGS:

None.



IMPRESSION:

No acute findings.  



Apparent faint 6 mm nodule in the right upper lobe.



Asymmetric opacity overlying the anterior left 1st rib is nonspecific 

and could be related to costochondritis however parenchymal nodule 

cannot be excluded. 



Correlation with CT scan of the thorax without intravenous contrast 

is recommended for further evaluation of these suspected findings. 



The final report is tagged to the PA review folder.

## 2019-04-11 NOTE — CARD
--------------- APPROVED REPORT --------------





Date of service: 04/10/2019



EKG Measurement

Heart [id number]

MN [id number]

[id number]

[id number]

[id number]



<Conclusion>

Normal sinus rhythm

Left axis deviation

ST & T wave abnormality, consider inferolateral ischemia

Abnormal ECG

## 2019-04-11 NOTE — RAD
Date of service: 



04/10/2019



PROCEDURE:  CHEST RADIOGRAPH, 1 VIEW



HISTORY:

chest pain



COMPARISON:

None available.



FINDINGS:



LUNGS:





The lungs are well inflated. No focal consolidation. There is an 

apparent nodular opacity overlying the left upper lobe. 



PLEURA:

No pneumothorax or pleural effusion.



CARDIOVASCULAR:

The heart is normal in size.  No aortic atherosclerotic 

calcifications present. 



OSSEOUS STRUCTURES:

Within normal limits for the patient's age.



VISUALIZED UPPER ABDOMEN:

Normal.



OTHER FINDINGS:

None. 



IMPRESSION:

Apparent nodular opacity in the left upper lobe which could represent 

costochondritis however parenchymal nodule cannot be excluded. 

Correlation with CT scan of the chest without intravenous contrast is 

recommended.



The final report is tagged to the PA review folder.